# Patient Record
Sex: FEMALE | Race: WHITE | NOT HISPANIC OR LATINO | URBAN - METROPOLITAN AREA
[De-identification: names, ages, dates, MRNs, and addresses within clinical notes are randomized per-mention and may not be internally consistent; named-entity substitution may affect disease eponyms.]

---

## 2018-11-20 ENCOUNTER — OFFICE VISIT (OUTPATIENT)
Dept: OBSTETRICS AND GYNECOLOGY | Age: 33
End: 2018-11-20

## 2018-11-20 VITALS
WEIGHT: 141.8 LBS | SYSTOLIC BLOOD PRESSURE: 112 MMHG | DIASTOLIC BLOOD PRESSURE: 78 MMHG | BODY MASS INDEX: 25.12 KG/M2 | HEIGHT: 63 IN

## 2018-11-20 DIAGNOSIS — F41.9 ANXIETY: Primary | ICD-10-CM

## 2018-11-20 DIAGNOSIS — Z00.00 ENCOUNTER FOR ANNUAL PHYSICAL EXAM: ICD-10-CM

## 2018-11-20 DIAGNOSIS — Z12.4 SCREENING FOR MALIGNANT NEOPLASM OF CERVIX: ICD-10-CM

## 2018-11-20 DIAGNOSIS — Z77.21 HISTORY OF EXPOSURE TO HAZARDOUS BODILY FLUIDS: ICD-10-CM

## 2018-11-20 DIAGNOSIS — Z13.89 SCREENING FOR BLOOD OR PROTEIN IN URINE: ICD-10-CM

## 2018-11-20 LAB
BILIRUB BLD-MCNC: NEGATIVE MG/DL
GLUCOSE UR STRIP-MCNC: NEGATIVE MG/DL
KETONES UR QL: NEGATIVE
LEUKOCYTE EST, POC: NEGATIVE
NITRITE UR-MCNC: NEGATIVE MG/ML
PH UR: 7 [PH] (ref 5–8)
PROT UR STRIP-MCNC: NEGATIVE MG/DL
RBC # UR STRIP: NEGATIVE /UL
SP GR UR: 1.01 (ref 1–1.03)
UROBILINOGEN UR QL: NORMAL

## 2018-11-20 PROCEDURE — 99385 PREV VISIT NEW AGE 18-39: CPT | Performed by: OBSTETRICS & GYNECOLOGY

## 2018-11-20 PROCEDURE — 81002 URINALYSIS NONAUTO W/O SCOPE: CPT | Performed by: OBSTETRICS & GYNECOLOGY

## 2018-11-20 RX ORDER — CETIRIZINE HYDROCHLORIDE 10 MG/1
10 TABLET ORAL DAILY
COMMUNITY
End: 2021-01-09

## 2018-11-20 RX ORDER — BUSPIRONE HYDROCHLORIDE 10 MG/1
10 TABLET ORAL 3 TIMES DAILY
COMMUNITY

## 2018-11-20 NOTE — PROGRESS NOTES
"Subjective     Chief Complaint   Patient presents with   • Gynecologic Exam     annual. last pap 2013 per pt. urinary frequency, painful urination, cloudy          History of Present Illness      Mirta Pascual is a very pleasant  33 y.o. female who presents for annual exam.  Mammo Exam none, Contraception none strongly recommending usage of condoms at least, Exercise encouraged increase.  Counseled patient about avoiding any type of smoking with a mirror monitor her cigarettes  Patient would like to have STI screening.      Obstetric History:  OB History      Para Term  AB Living    0 0 0 0 0 0    SAB TAB Ectopic Molar Multiple Live Births    0 0 0 0 0 0         Menstrual History:     Patient's last menstrual period was 10/16/2018.       Sexual History:       Past Medical History:   Diagnosis Date   • UTI (urinary tract infection)      Past Surgical History:   Procedure Laterality Date   • WISDOM TOOTH EXTRACTION         SOCIAL Hx:      The following portions of the patient's history were reviewed and updated as appropriate: allergies, current medications, past family history, past medical history, past social history, past surgical history and problem list.    Review of Systems        Except as outlined in history of physical illness, patient denies any changes in her GYN, , GI systems.  All other systems reviewed were negative.         Objective   Physical Exam    /78   Ht 160 cm (63\")   Wt 64.3 kg (141 lb 12.8 oz)   LMP 10/16/2018   BMI 25.12 kg/m²     General: Patient is alert and oriented and appears overall healthy  Neck: Is supple without thyromegaly, no carotid bruits and no lymphadenopathy  Lungs: Clear bilaterally, no wheezing, rhonchi, or rales.  Respiratory rate is normal  Breast: Even, symmetrical, no lymphadenopathy, no retraction, no masses or cysts  Heart: Regular rate and rhythm are appreciated, no murmurs or rubs are heard  Abdomen: Is soft, without organomegaly, bowel " sounds are positive, there is no                                rebound or guarding and palpation does not produce any discomfort  Back: Nontender without CVA tenderness  Pelvic: External genitalia appear normal and consistent with mature female.  BUS normal                            Vagina is clean dry without discharge and appears adequately estrogenized, no               lesions or masses are present                         Cervix is noninflamed without discharge or lesions.  There is no cervical motion             tenderness.                Uterus is nonenlarged, without tenderness, and no masses or abnormalities are  present               Adnexa are non-enlarged, non tender               Rectal exam reveals adequate sphincter tone and no masses or lesions are                     appreciated on digital rectal examination.    Patient Active Problem List   Diagnosis   • Anxiety                 Assessment/Plan   Mirta was seen today for gynecologic exam.    Diagnoses and all orders for this visit:    Anxiety  -     NuSwab VG+ - Swab, Vagina  -     RPR  -     HIV-1 / O / 2 Ag / Antibody 4th Generation  -     Hepatitis C Antibody    History of exposure to hazardous bodily fluids  -     NuSwab VG+ - Swab, Vagina  -     RPR  -     HIV-1 / O / 2 Ag / Antibody 4th Generation  -     Hepatitis C Antibody    Encounter for annual physical exam    Pap smear  Contraceptive options reviewed    Annual Well Woman Exam    Discussed today's findings and concerns with patient in detail.  Continue to recommend regular exercise to include cardiovascular and resistance training and breast self-exam. Wellness lab, mammography, and pap smear, in accordance with age guidelines.  Nutritional  recommendations  were discussed.    All of the patient's questions were addressed and answered, I have encouraged her to call for today's test results if she has not received them within 10 days.  Patient is advised to call with any change in her  condition or with any other questions, otherwise return in 12 months for annual examination.

## 2018-11-21 LAB
HCV AB S/CO SERPL IA: 0.2 S/CO RATIO (ref 0–0.9)
HIV 1+2 AB+HIV1 P24 AG SERPL QL IA: NON REACTIVE
RPR SER QL: NORMAL

## 2018-11-21 NOTE — PROGRESS NOTES
Please notify pt. That labs are with in normal limits bloodwork results are all normal as was a urinalysis, still waiting on the culture

## 2018-11-22 LAB
A VAGINAE DNA VAG QL NAA+PROBE: ABNORMAL SCORE
BVAB2 DNA VAG QL NAA+PROBE: ABNORMAL SCORE
C ALBICANS DNA VAG QL NAA+PROBE: NEGATIVE
C GLABRATA DNA VAG QL NAA+PROBE: NEGATIVE
C TRACH RRNA SPEC QL NAA+PROBE: POSITIVE
MEGA1 DNA VAG QL NAA+PROBE: ABNORMAL SCORE
N GONORRHOEA RRNA SPEC QL NAA+PROBE: NEGATIVE
T VAGINALIS RRNA SPEC QL NAA+PROBE: NEGATIVE

## 2018-11-23 PROBLEM — Z20.2 CHLAMYDIA CONTACT, TREATED: Status: ACTIVE | Noted: 2018-11-23

## 2018-11-23 RX ORDER — AZITHROMYCIN 250 MG/1
TABLET, FILM COATED ORAL
Qty: 6 TABLET | Refills: 0 | OUTPATIENT
Start: 2018-11-23 | End: 2021-01-09

## 2018-11-26 ENCOUNTER — TELEPHONE (OUTPATIENT)
Dept: OBSTETRICS AND GYNECOLOGY | Age: 33
End: 2018-11-26

## 2018-11-26 LAB
CYTOLOGIST CVX/VAG CYTO: ABNORMAL
CYTOLOGY CVX/VAG DOC THIN PREP: ABNORMAL
DX ICD CODE: ABNORMAL
DX ICD CODE: ABNORMAL
HIV 1 & 2 AB SER-IMP: ABNORMAL
HPV I/H RISK 4 DNA CVX QL PROBE+SIG AMP: POSITIVE
HPV16 DNA CVX QL PROBE+SIG AMP: POSITIVE
HPV18+45 E6+E7 MRNA CVX QL NAA+PROBE: POSITIVE
OTHER STN SPEC: ABNORMAL
PATH REPORT.FINAL DX SPEC: ABNORMAL
PATHOLOGIST CVX/VAG CYTO: ABNORMAL
RECOM F/U CVX/VAG CYTO: ABNORMAL
STAT OF ADQ CVX/VAG CYTO-IMP: ABNORMAL

## 2018-11-26 RX ORDER — AZITHROMYCIN 500 MG/1
TABLET, FILM COATED ORAL
Qty: 6 TABLET | Refills: 0 | Status: CANCELLED | OUTPATIENT
Start: 2018-11-26

## 2018-11-26 RX ORDER — AZITHROMYCIN 250 MG/1
TABLET, FILM COATED ORAL
Qty: 6 TABLET | Refills: 0 | Status: SHIPPED | OUTPATIENT
Start: 2018-11-26 | End: 2018-12-01

## 2018-11-26 NOTE — TELEPHONE ENCOUNTER
Patient would like to speak with Maryse about the prescriptions.  Patient does not have insurance and would like to know the cost. Informed patient she will need to call her pharmacy to see what the price is.

## 2018-11-26 NOTE — TELEPHONE ENCOUNTER
RX was sent to print instead of to pharmacy. Sent e-rx to pharmacy. Pt notified. I also answered additional questions pt had about diagnosis.

## 2018-11-26 NOTE — TELEPHONE ENCOUNTER
----- Message from Delvin Greco MD sent at 11/21/2018  8:52 AM EST -----  Please notify pt. That labs are with in normal limits bloodwork results are all normal as was a urinalysis, still waiting on the culture

## 2018-11-27 ENCOUNTER — TELEPHONE (OUTPATIENT)
Dept: OBSTETRICS AND GYNECOLOGY | Age: 33
End: 2018-11-27

## 2018-11-27 NOTE — TELEPHONE ENCOUNTER
----- Message from Delvin Greco MD sent at 11/27/2018  8:48 AM EST -----  Please call the patient regarding her abnormal result. 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

## 2018-11-28 ENCOUNTER — TELEPHONE (OUTPATIENT)
Dept: OBSTETRICS AND GYNECOLOGY | Age: 33
End: 2018-11-28

## 2020-04-29 ENCOUNTER — OFFICE VISIT (OUTPATIENT)
Dept: OBSTETRICS AND GYNECOLOGY | Age: 35
End: 2020-04-29

## 2020-04-29 VITALS
DIASTOLIC BLOOD PRESSURE: 64 MMHG | WEIGHT: 141 LBS | HEIGHT: 63 IN | BODY MASS INDEX: 24.98 KG/M2 | SYSTOLIC BLOOD PRESSURE: 110 MMHG

## 2020-04-29 DIAGNOSIS — N72 CERVICITIS AND ENDOCERVICITIS: ICD-10-CM

## 2020-04-29 DIAGNOSIS — R10.31 BILATERAL LOWER ABDOMINAL PAIN: Primary | ICD-10-CM

## 2020-04-29 DIAGNOSIS — R10.2 PELVIC PAIN: ICD-10-CM

## 2020-04-29 DIAGNOSIS — R10.32 BILATERAL LOWER ABDOMINAL PAIN: Primary | ICD-10-CM

## 2020-04-29 DIAGNOSIS — Z20.2 CHLAMYDIA CONTACT, TREATED: ICD-10-CM

## 2020-04-29 LAB
BILIRUB BLD-MCNC: NEGATIVE MG/DL
CLARITY, POC: CLEAR
COLOR UR: YELLOW
GLUCOSE UR STRIP-MCNC: NEGATIVE MG/DL
KETONES UR QL: NEGATIVE
LEUKOCYTE EST, POC: ABNORMAL
NITRITE UR-MCNC: NEGATIVE MG/ML
PH UR: 6.5 [PH] (ref 5–8)
PROT UR STRIP-MCNC: NEGATIVE MG/DL
RBC # UR STRIP: NEGATIVE /UL
SP GR UR: 1.02 (ref 1–1.03)
UROBILINOGEN UR QL: NORMAL

## 2020-04-29 PROCEDURE — 99213 OFFICE O/P EST LOW 20 MIN: CPT | Performed by: OBSTETRICS & GYNECOLOGY

## 2020-04-29 PROCEDURE — 81002 URINALYSIS NONAUTO W/O SCOPE: CPT | Performed by: OBSTETRICS & GYNECOLOGY

## 2020-04-29 RX ORDER — GUANFACINE 1 MG/1
TABLET, EXTENDED RELEASE ORAL
COMMUNITY
Start: 2020-04-07

## 2020-04-29 RX ORDER — DOXYCYCLINE HYCLATE 100 MG
100 TABLET ORAL 2 TIMES DAILY
Qty: 14 TABLET | Refills: 1 | OUTPATIENT
Start: 2020-04-29 | End: 2021-01-09

## 2020-04-29 RX ORDER — ECHINACEA PURPUREA EXTRACT 125 MG
1 TABLET ORAL AS NEEDED
COMMUNITY
End: 2021-01-09

## 2020-04-29 RX ORDER — FLUTICASONE PROPIONATE 50 MCG
SPRAY, SUSPENSION (ML) NASAL
COMMUNITY
Start: 2020-03-20

## 2020-04-29 NOTE — PROGRESS NOTES
Subjective     Chief Complaint   Patient presents with   • Gynecologic Exam     Gyn problem: sharp pains,bilateral sides x 6 months       History of Present Illness  Mirta Pascual is a 35 y.o. female is being seen today for 2 to 3-month history of bilateral intermittent lower abdominal pain that was mildly present even for the past 6 months    Patient never followed up with us for her scheduled colposcopy for high risk HPV    She is had chlamydia several times, she stated that she followed up with health department for her Pap smear and colposcopy    Her cycles are regular.  She does not like birth control pills she is using condoms regularly    She denies any fever chills back pain dysuria.    She states she is only had 2 sexual partners but they have tested positive for chlamydia as well in the past  Chief Complaint   Patient presents with   • Gynecologic Exam     Gyn problem: sharp pains,bilateral sides x 6 months   .        The following portions of the patient's history were reviewed and updated as appropriate: allergies, current medications, past family history, past medical history, past social history, past surgical history and problem list.    PAST MEDICAL HISTORY  Past Medical History:   Diagnosis Date   • UTI (urinary tract infection)      OB History    Para Term  AB Living   0 0 0 0 0 0   SAB TAB Ectopic Molar Multiple Live Births   0 0 0 0 0 0     Past Surgical History:   Procedure Laterality Date   • WISDOM TOOTH EXTRACTION       Family History   Problem Relation Age of Onset   • Hypertension Father    • Hypertension Paternal Grandmother    • Diabetes Paternal Grandmother    • Heart disease Paternal Grandmother      Social History     Tobacco Use   Smoking Status Never Smoker   Smokeless Tobacco Never Used       Current Outpatient Medications:   •  busPIRone (BUSPAR) 10 MG tablet, Take 10 mg by mouth 3 (Three) Times a Day., Disp: , Rfl:   •  cetirizine (zyrTEC) 10 MG tablet, Take 10 mg by  mouth Daily., Disp: , Rfl:   •  fluticasone (FLONASE) 50 MCG/ACT nasal spray, , Disp: , Rfl:   •  guanFACINE HCl ER (INTUNIV) 1 MG tablet sustained-release 24 hour, , Disp: , Rfl:   •  MUCINEX 600 MG 12 hr tablet, , Disp: , Rfl:   •  sodium chloride 0.65 % nasal spray, 1 spray into the nostril(s) as directed by provider As Needed for Congestion., Disp: , Rfl:   •  azithromycin (ZITHROMAX Z-ADAN) 250 MG tablet, Take 2 tablets (500 mg) on  Day 1,  followed by 1 tablet (250 mg) once daily on Days 2 through 5., Disp: 6 tablet, Rfl: 0  •  doxycycline (VIBRAMYICN) 100 MG tablet, Take 1 tablet by mouth 2 (Two) Times a Day., Disp: 14 tablet, Rfl: 1    There is no immunization history on file for this patient.    Review of Systems       Except as outlined in history of physical illness, patient denies any changes in her GYN, , GI systems. All other systems reviewed are negative.    Objective   Physical Exam   Alert and oriented, respirations unlabored, heart regular rate and rhythm   Pelvic external genitalia normal vagina clean dry intact cervix is very friable but there is no discharge, it bled easily with culture and Pap smear, cervix uterus not enlarged adnexa not enlarged minimal discomfort on either side.  No rebound no guarding.  Rectal exams normal      Assessment/Plan   Mirta was seen today for gynecologic exam.    Diagnoses and all orders for this visit:    Bilateral lower abdominal pain  -     POC Urinalysis Dipstick  -     RPR  -     HIV-1 / O / 2 Ag / Antibody 4th Generation  -     Hepatitis C Antibody    Chlamydia contact, treated  -     RPR  -     HIV-1 / O / 2 Ag / Antibody 4th Generation  -     Hepatitis C Antibody    Pelvic pain    Cervicitis and endocervicitis  -     RPR  -     HIV-1 / O / 2 Ag / Antibody 4th Generation  -     Hepatitis C Antibody    Other orders  -     doxycycline (VIBRAMYICN) 100 MG tablet; Take 1 tablet by mouth 2 (Two) Times a Day.    We will await results of STD screening, treat her  with the doxycycline, and tentatively follow-up with us in 4 weeks time for an ultrasound if her pain is not completely better after antibiotic therapy  Have advised her to have her partners screened as well and continue using condoms, she declines any other form of birth control  If pain continues and ultrasound is negative could proceed with a diagnostic laparoscopy.  Also discussed the likelihood that she might need a colposcopy as we had originally scheduled, but she followed up with the health department for insurance reasons.                     EMR Dragon/ Transcription disclaimer:  Much of the encounter note is an electronic transcription/translation of spoken language to printed text. The electronic translation of spoken language may permit erroneous, or at times, nonessential words or phrases to be inadvertently transcribes; Although i have reviewed the note for such errors, some may still exist.

## 2020-04-30 LAB
HCV AB S/CO SERPL IA: <0.1 S/CO RATIO (ref 0–0.9)
HIV 1+2 AB+HIV1 P24 AG SERPL QL IA: NON REACTIVE
RPR SER QL: NORMAL

## 2020-05-01 ENCOUNTER — TELEPHONE (OUTPATIENT)
Dept: OBSTETRICS AND GYNECOLOGY | Age: 35
End: 2020-05-01

## 2020-05-01 LAB
CYTOLOGIST CVX/VAG CYTO: ABNORMAL
CYTOLOGY CVX/VAG DOC CYTO: ABNORMAL
CYTOLOGY CVX/VAG DOC THIN PREP: ABNORMAL
DX ICD CODE: ABNORMAL
DX ICD CODE: ABNORMAL
HIV 1 & 2 AB SER-IMP: ABNORMAL
HPV I/H RISK 4 DNA CVX QL PROBE+SIG AMP: POSITIVE
HPV16 DNA CVX QL PROBE+SIG AMP: POSITIVE
HPV18+45 E6+E7 MRNA CVX QL NAA+PROBE: POSITIVE
OTHER STN SPEC: ABNORMAL
PATHOLOGIST CVX/VAG CYTO: ABNORMAL
RECOM F/U CVX/VAG CYTO: ABNORMAL
STAT OF ADQ CVX/VAG CYTO-IMP: ABNORMAL

## 2020-05-01 NOTE — TELEPHONE ENCOUNTER
----- Message from Delvin Greco MD sent at 5/1/2020  9:12 AM EDT -----  Please notify pt. That labs are with in normal limits, the lab work portion has come back we are still awaiting the cultures.

## 2020-05-04 ENCOUNTER — TELEPHONE (OUTPATIENT)
Dept: OBSTETRICS AND GYNECOLOGY | Age: 35
End: 2020-05-04

## 2020-05-04 NOTE — TELEPHONE ENCOUNTER
----- Message from Delvin Greco MD sent at 5/1/2020 11:40 PM EDT -----  Please notify patient that her Pap smear shows mild dysplasia and positive HPV recommend following up with colposcopy as previously discussed

## 2020-05-06 ENCOUNTER — TELEPHONE (OUTPATIENT)
Dept: OBSTETRICS AND GYNECOLOGY | Age: 35
End: 2020-05-06

## 2020-05-06 LAB
A VAGINAE DNA VAG QL NAA+PROBE: ABNORMAL SCORE
BVAB2 DNA VAG QL NAA+PROBE: ABNORMAL SCORE
C ALBICANS DNA VAG QL NAA+PROBE: NEGATIVE
C GLABRATA DNA VAG QL NAA+PROBE: NEGATIVE
C TRACH DNA VAG QL NAA+PROBE: NEGATIVE
MEGA1 DNA VAG QL NAA+PROBE: ABNORMAL SCORE
N GONORRHOEA DNA VAG QL NAA+PROBE: NEGATIVE
T VAGINALIS DNA VAG QL NAA+PROBE: NEGATIVE

## 2020-05-06 RX ORDER — METRONIDAZOLE 500 MG/1
500 TABLET ORAL 3 TIMES DAILY
Qty: 21 TABLET | Refills: 0 | Status: SHIPPED | OUTPATIENT
Start: 2020-05-06 | End: 2020-05-13

## 2020-05-06 NOTE — TELEPHONE ENCOUNTER
Flagyl is the gold standard treatment medication for BV. Could use tindamax if desired or could try a vaginal cream as well

## 2020-05-06 NOTE — TELEPHONE ENCOUNTER
Pt requesting Maryse to call her back @ 3:30 regarding her lab results and medication she has to take

## 2020-05-06 NOTE — TELEPHONE ENCOUNTER
----- Message from Delvin Greco MD sent at 5/6/2020 10:27 AM EDT -----  Cultures showed BV but no chlamydia or gonorrhea or yeast or trichomonas.  I have sent some antibiotics in for her bacterial vaginosis.  Also needs to keep her colposcopy appointment as discussed

## 2020-05-06 NOTE — TELEPHONE ENCOUNTER
(Angus pt)  Pt states Dr. Greco called in flagyl again but she wanted to know if there was something stronger she could take.   Dr. Greco & Maryse have left for the day.      Pharmacy verified.    [phone]

## 2020-05-07 ENCOUNTER — TELEPHONE (OUTPATIENT)
Dept: OBSTETRICS AND GYNECOLOGY | Age: 35
End: 2020-05-07

## 2020-06-03 ENCOUNTER — PROCEDURE VISIT (OUTPATIENT)
Dept: OBSTETRICS AND GYNECOLOGY | Age: 35
End: 2020-06-03

## 2020-06-03 ENCOUNTER — OFFICE VISIT (OUTPATIENT)
Dept: OBSTETRICS AND GYNECOLOGY | Age: 35
End: 2020-06-03

## 2020-06-03 VITALS
DIASTOLIC BLOOD PRESSURE: 68 MMHG | SYSTOLIC BLOOD PRESSURE: 120 MMHG | WEIGHT: 138 LBS | HEIGHT: 63 IN | BODY MASS INDEX: 24.45 KG/M2

## 2020-06-03 DIAGNOSIS — B97.7 HPV IN FEMALE: Primary | ICD-10-CM

## 2020-06-03 DIAGNOSIS — R10.2 PELVIC PAIN: Primary | ICD-10-CM

## 2020-06-03 DIAGNOSIS — R87.612 LGSIL ON PAP SMEAR OF CERVIX: ICD-10-CM

## 2020-06-03 PROBLEM — Q51.3 BICORNUATE UTERUS: Status: ACTIVE | Noted: 2020-06-03

## 2020-06-03 PROCEDURE — 57456 ENDOCERV CURETTAGE W/SCOPE: CPT | Performed by: OBSTETRICS & GYNECOLOGY

## 2020-06-03 PROCEDURE — 76830 TRANSVAGINAL US NON-OB: CPT | Performed by: OBSTETRICS & GYNECOLOGY

## 2020-06-03 NOTE — PROGRESS NOTES
Procedure   Procedures       Physical Exam    Colposcopy Procedure Note        Indications: Pap smear   showed: low-grade squamous intraepithelial neoplasia (LGSIL - encompassing HPV,mild dysplasia,JG I)        Procedure Details   The risks and benefits of the procedure and verbal, informed consent obtained.    Speculum placed in vagina and excellent visualization of cervix achieved, cervix swabbed x 3 with acetic acid solution. Lugol's solution was also utilized for another visualization of the cervix.    Findings:  Cervix: acetowhite lesion(s) noted at 2:00 6:00, very light very mild no surface changes no abnormal vasculature, transformation zone was normal o'clock; cervix swabbed with Lugol's solution, endocervical speculum placed and SCJ visualized 360 degrees without lesions.  Vaginal inspection: normal without visible lesions.  Vulvar colposcopy: vulvar colposcopy not performed.    Specimens: ECC    Complications: none.  Patient tolerated the procedure well  Plan:  Will be based on findings and results from today's exam.  If consistent with LGSIL or mild dysplasia patient will simply return for next year's annual exam        6/3/2020  Delvin Greco MD    EMR Dragon/ Transcription disclaimer:  Much of the encounter note is an electronic transcription/translation of spoken language to printed text. The electronic translation of spoken language may permit erroneous, or at times, nonessential words or phrases to be inadvertently transcribes; Although i have reviewed the note for such errors, some may still exist.

## 2020-06-05 LAB
DX ICD CODE: NORMAL
PATH REPORT.FINAL DX SPEC: NORMAL
PATH REPORT.GROSS SPEC: NORMAL
PATH REPORT.SITE OF ORIGIN SPEC: NORMAL
PATHOLOGIST NAME: NORMAL
PAYMENT PROCEDURE: NORMAL

## 2020-06-08 ENCOUNTER — TELEPHONE (OUTPATIENT)
Dept: OBSTETRICS AND GYNECOLOGY | Age: 35
End: 2020-06-08

## 2020-06-08 NOTE — TELEPHONE ENCOUNTER
----- Message from Delvin Greco MD sent at 6/8/2020  9:36 AM EDT -----  Please notify pt. That labs are with in normal limits

## 2020-07-02 ENCOUNTER — TELEPHONE (OUTPATIENT)
Dept: OBSTETRICS AND GYNECOLOGY | Age: 35
End: 2020-07-02

## 2020-07-02 RX ORDER — SULFAMETHOXAZOLE AND TRIMETHOPRIM 400; 80 MG/1; MG/1
1 TABLET ORAL 2 TIMES DAILY
Qty: 10 TABLET | Refills: 0 | Status: SHIPPED | OUTPATIENT
Start: 2020-07-02 | End: 2020-07-07

## 2020-07-02 NOTE — TELEPHONE ENCOUNTER
WOJCIECH.. Spoke with pt. She just got your message and is unable to leave work and get here before you leave for the day.  I informed her that you called the medication into her pharmacy and to start taking it. If she doesn't feel that symptoms are improving by Monday, to give us a call.

## 2020-07-02 NOTE — TELEPHONE ENCOUNTER
I called patient to see if she could come in this morning while we were in the office.  She did not answer and it was transferred to voicemail.  I let her know that I wanted her to come in but in the meantime I am going to send some Septra and for her suspected urinary tract infection

## 2020-07-02 NOTE — TELEPHONE ENCOUNTER
Patient is calling:  C/O painful urination, cloudy and frequency.  She also says that she has a swelling on the left side of he vagina.  Please advise.

## 2020-07-07 ENCOUNTER — TELEPHONE (OUTPATIENT)
Dept: OBSTETRICS AND GYNECOLOGY | Age: 35
End: 2020-07-07

## 2020-07-07 NOTE — TELEPHONE ENCOUNTER
Patient called, stated that she was  prescribed smz-tmp 400-80mg her symptoms have improved, but  Her clitoris is still inflamed and hurts. patient wanted to know what you advised?

## 2020-07-08 NOTE — TELEPHONE ENCOUNTER
It often takes a while after treatment for symptoms to completely resolve.  That set of circumstances is not unusual.  However, we can always reinspect and reexamine if she would like.  If so can work in with me or nurse practitioner next week

## 2020-07-08 NOTE — TELEPHONE ENCOUNTER
"Called patient to notified. Patient stated that her urine is cloudy after she uses the restroom and has the sensation to where she has to  \"bare down\".Patient stated that its still pressure in her vagina area, and her clitoris is still itching, she also stated that she is nervous to have intercourse its because its painful (patient stated that her symptoms have gotten better, but the itching is still there)        Patient still has concerns might feel as though everything is still not be treated. Patient's overall question is all of these symptoms normal? Is this how her body is supposed to react to antibiotics? does she need to be prescribed more medication, and she also wanted to know what you advised.   "

## 2021-04-02 ENCOUNTER — TELEPHONE (OUTPATIENT)
Dept: OBSTETRICS AND GYNECOLOGY | Age: 36
End: 2021-04-02

## 2021-04-02 NOTE — TELEPHONE ENCOUNTER
Dr. Greco pt calling is having possible ovary pain, had US prior in June 2020, pt would like to know what they can do or their options.

## 2021-04-07 ENCOUNTER — OFFICE VISIT (OUTPATIENT)
Dept: OBSTETRICS AND GYNECOLOGY | Age: 36
End: 2021-04-07

## 2021-04-07 VITALS
BODY MASS INDEX: 23.57 KG/M2 | DIASTOLIC BLOOD PRESSURE: 64 MMHG | SYSTOLIC BLOOD PRESSURE: 104 MMHG | WEIGHT: 133 LBS | HEIGHT: 63 IN

## 2021-04-07 DIAGNOSIS — R10.2 PELVIC PAIN: ICD-10-CM

## 2021-04-07 DIAGNOSIS — N94.0 OVULATORY PAIN: Primary | ICD-10-CM

## 2021-04-07 LAB
BILIRUB BLD-MCNC: NEGATIVE MG/DL
CLARITY, POC: CLEAR
COLOR UR: YELLOW
GLUCOSE UR STRIP-MCNC: NEGATIVE MG/DL
KETONES UR QL: NEGATIVE
LEUKOCYTE EST, POC: NEGATIVE
NITRITE UR-MCNC: NEGATIVE MG/ML
PH UR: 6.5 [PH] (ref 5–8)
PROT UR STRIP-MCNC: NEGATIVE MG/DL
RBC # UR STRIP: NEGATIVE /UL
SP GR UR: 1.02 (ref 1–1.03)
UROBILINOGEN UR QL: NORMAL

## 2021-04-07 PROCEDURE — 99213 OFFICE O/P EST LOW 20 MIN: CPT | Performed by: NURSE PRACTITIONER

## 2021-04-07 PROCEDURE — 81002 URINALYSIS NONAUTO W/O SCOPE: CPT | Performed by: NURSE PRACTITIONER

## 2021-04-07 RX ORDER — LORATADINE 10 MG/1
TABLET ORAL
COMMUNITY
Start: 2021-01-06

## 2021-04-07 NOTE — PROGRESS NOTES
"Subjective     Chief Complaint   Patient presents with   • Gynecologic Exam     Pelvic pain       Mirta Pascual is a 36 y.o.  whose LMP is Patient's last menstrual period was 2021 (exact date).     Pt presents today with chief complaint of pelvic pain   She states her periods are regular about every 28-32 days  Having pain mid cycle c/w ovulatory pain  This has been ongoing for quite sometime  She states the pain will last a few minutes sometimes longer and then go away  Denies vaginal discharge or dysuria  Was with one partner for the past year - recently broke up and had IC with new sexual partner  She declines STD testing  She has no abnormal discharge  Pt of Dr. Greco    No Additional Complaints Reported    The following portions of the patient's history were reviewed and updated as appropriate:vital signs, allergies, current medications, past medical history, past social history, past surgical history and problem list      Review of Systems   A comprehensive review of systems was negative except for: Genitourinary: positive for pelvic pain      Objective      /64   Ht 160 cm (63\")   Wt 60.3 kg (133 lb)   LMP 2021 (Exact Date)   BMI 23.56 kg/m²     Physical Exam    General:   alert and no distress   Heart: Not performed today   Lungs: Not performed today.   Breast: na   Neck: na   Abdomen: {Not performed today   CVA: Not performed today   Pelvis: External genitalia: normal general appearance  Urinary system: urethral meatus normal  Vaginal: normal mucosa without prolapse or lesions, normal without tenderness, induration or masses, normal rugae and discharge, white (c/w yeast)  Cervix: normal appearance  Adnexa: normal bimanual exam  Uterus: normal single, nontender   Extremities: Not performed today   Neurologic: negative   Psychiatric: Normal affect, judgement, and mood       Lab Review   Labs: U/A - negative    Imaging   Ultrasound - Pelvic Vaginal - Endometrial thickness 9.08 " mm  Left follicle measuring 12.5 mm x 17.1 mm  Right ovary normal    Assessment/Plan     ASSESSMENT  1. Ovulatory pain    2. Pelvic pain        PLAN  1.   Orders Placed This Encounter   Procedures   • POC Urinalysis Dipstick       2. Medications prescribed this encounter:      No orders of the defined types were placed in this encounter.      3. U/S reassuring. C/w menstrual cycle. Pain c/w with ovulatory pain. Discussed OCP's for ovarian suppression but patient declines.  She declines STD testing today    Follow up: 1 month(s) for annual exam     GABRIELLA Srivastava  4/7/2021

## 2021-04-16 ENCOUNTER — BULK ORDERING (OUTPATIENT)
Dept: CASE MANAGEMENT | Facility: OTHER | Age: 36
End: 2021-04-16

## 2021-04-16 DIAGNOSIS — Z23 IMMUNIZATION DUE: ICD-10-CM

## 2021-05-25 ENCOUNTER — OFFICE VISIT (OUTPATIENT)
Dept: OBSTETRICS AND GYNECOLOGY | Age: 36
End: 2021-05-25

## 2021-05-25 VITALS
HEIGHT: 63 IN | SYSTOLIC BLOOD PRESSURE: 118 MMHG | DIASTOLIC BLOOD PRESSURE: 72 MMHG | WEIGHT: 131 LBS | BODY MASS INDEX: 23.21 KG/M2

## 2021-05-25 DIAGNOSIS — N94.0 MITTELSCHMERZ: ICD-10-CM

## 2021-05-25 DIAGNOSIS — Z30.09 GENERAL COUNSELING AND ADVICE FOR CONTRACEPTIVE MANAGEMENT: ICD-10-CM

## 2021-05-25 DIAGNOSIS — Z01.419 ENCOUNTER FOR GYNECOLOGICAL EXAMINATION: Primary | ICD-10-CM

## 2021-05-25 DIAGNOSIS — Z77.21 HISTORY OF EXPOSURE TO HAZARDOUS BODILY FLUIDS: ICD-10-CM

## 2021-05-25 PROCEDURE — 99395 PREV VISIT EST AGE 18-39: CPT | Performed by: OBSTETRICS & GYNECOLOGY

## 2021-05-25 RX ORDER — GUANFACINE 2 MG/1
1 TABLET, EXTENDED RELEASE ORAL
COMMUNITY
Start: 2021-05-21

## 2021-05-25 RX ORDER — NORETHINDRONE ACETATE AND ETHINYL ESTRADIOL 1; .02 MG/1; MG/1
1 TABLET ORAL DAILY
Qty: 21 TABLET | Refills: 12 | Status: SHIPPED | OUTPATIENT
Start: 2021-05-25 | End: 2022-05-25

## 2021-05-25 NOTE — PROGRESS NOTES
Subjective     Chief Complaint   Patient presents with   • Gynecologic Exam     Annual:last pap          History of Present Illness      Mirta Pascual is a very pleasant  36 y.o. female who presents for annual exam.  Mammo Exam age 41, Contraception condoms, Exercise 5 times a week    Patient is trying to straighten out her life, she is engaging in less frequent high risk behavior but she has had exposure to bodily fluids    We have recommended her getting back on birth control pills but she has not, she occasionally uses condoms but not always.  We have extensively talked about high risk behavior and potential for pregnancy and STDs.  Patient will consider getting back on birth control pills, we talked about symptoms side effects and benefits.  We also discussed the importance to use condoms for STD prevention.  Patient has given me permission to go ahead and send some to her pharmacy she may or may not pick those up.    Shi seems to be improved, talked about nonsteroidals for a day or so at a time.  Also discussed how important and what an improvement she would likely have if she was on birth control pills.    History of abnormal Pap smears, will await today's Pap result.      Obstetric History:  OB History        0    Para   0    Term   0       0    AB   0    Living   0       SAB   0    TAB   0    Ectopic   0    Molar   0    Multiple   0    Live Births   0               Menstrual History:     Patient's last menstrual period was 2021 (exact date).       Sexual History:       Past Medical History:   Diagnosis Date   • Bicornuate uterus 6/3/2020   • UTI (urinary tract infection)      Past Surgical History:   Procedure Laterality Date   • WISDOM TOOTH EXTRACTION         SOCIAL Hx:      The following portions of the patient's history were reviewed and updated as appropriate: allergies, current medications, past family history, past medical history, past social history, past surgical  "history and problem list.    Review of Systems        Except as outlined in history of physical illness, patient denies any changes in her GYN, , GI systems.  All other systems reviewed were negative.         Current Outpatient Medications:   •  busPIRone (BUSPAR) 10 MG tablet, Take 10 mg by mouth 3 (Three) Times a Day., Disp: , Rfl:   •  fluticasone (FLONASE) 50 MCG/ACT nasal spray, , Disp: , Rfl:   •  guanFACINE HCl ER (INTUNIV) 1 MG tablet sustained-release 24 hour, , Disp: , Rfl:   •  guanFACINE HCl ER 2 MG tablet sustained-release 24 hour, Take 1 tablet by mouth every night at bedtime., Disp: , Rfl:   •  loratadine (CLARITIN) 10 MG tablet, TAKE 1 TABLET BY MOUTH EVERY DAY for allergies, Disp: , Rfl:   •  norethindrone-ethinyl estradiol (Loestrin 1/20, 21,) 1-20 MG-MCG per tablet, Take 1 tablet by mouth Daily., Disp: 21 tablet, Rfl: 12   Objective   Physical Exam    /72   Ht 160 cm (63\")   Wt 59.4 kg (131 lb)   LMP 05/23/2021 (Exact Date)   Breastfeeding No   BMI 23.21 kg/m²     General: Patient is alert and oriented and appears overall healthy  Neck: Is supple without thyromegaly, no carotid bruits and no lymphadenopathy  Lungs: Clear bilaterally, no wheezing, rhonchi, or rales.  Respiratory rate is normal  Breast: Even, symmetrical, no lymphadenopathy, no retraction, no masses or cysts  Heart: Regular rate and rhythm are appreciated, no murmurs or rubs are heard  Abdomen: Is soft, without organomegaly, bowel sounds are positive, there is no                                rebound or guarding and palpation does not produce any discomfort  Back: Nontender without CVA tenderness  Pelvic: External genitalia appear normal and consistent with mature female.  BUS normal                            Vagina is clean dry without discharge and appears adequately estrogenized, no               lesions or masses are present                         Cervix is noninflamed without discharge or lesions.  There is no " cervical motion             tenderness.                Uterus is nonenlarged, without tenderness, and no masses or abnormalities are  present               Adnexa are non-enlarged, non tender               Rectal exam reveals adequate sphincter tone and no masses or lesions are                     appreciated on digital rectal examination.      Annual Well Woman Exam  Patient Active Problem List   Diagnosis   • Anxiety   • Chlamydia contact, treated   • Pelvic pain   • Cervicitis and endocervicitis   • HPV in female   • LGSIL on Pap smear of cervix   • Bicornuate uterus   • Shi                 Assessment/Plan   Diagnoses and all orders for this visit:    1. Encounter for gynecological examination (Primary)  -     IGP,CtNgTv,rfx Aptima HPV ASCU    2. General counseling and advice for contraceptive management  We have recommended her getting back on birth control pills but she has not, she occasionally uses condoms but not always.  We have extensively talked about high risk behavior and potential for pregnancy and STDs.  Patient will consider getting back on birth control pills, we talked about symptoms side effects and benefits.  We also discussed the importance to use condoms for STD prevention.  Patient has given me permission to go ahead and send some to her pharmacy she may or may not pick those up.      3. History of exposure to hazardous bodily fluids  Again counseled her at length regarding minimizing her high risk behavior and there has been some significant improvement on that front.  We will check a GC chlamydia today.  She declines further STD testing.  4. Shi  As outlined above slight improvement will continue to use nonsteroidals  Other orders  -     norethindrone-ethinyl estradiol (Loestrin 1/20, 21,) 1-20 MG-MCG per tablet; Take 1 tablet by mouth Daily.  Dispense: 21 tablet; Refill: 12      Discussed today's findings and concerns with patient.  Continue to recommend regular exercise  including cardiovascular and resistance training as well as  breast self-exam. Wellness lab, mammography, & pap smear, in accordance with age guidelines.    I have encouraged her to call for today's test results if she has not received them within 10 days.  Patient is advised to call with any change in her condition or with any other questions, otherwise return  for annual examination.

## 2021-05-28 LAB
C TRACH RRNA CVX QL NAA+PROBE: NEGATIVE
CONV .: NORMAL
CYTOLOGIST CVX/VAG CYTO: NORMAL
CYTOLOGY CVX/VAG DOC CYTO: NORMAL
CYTOLOGY CVX/VAG DOC THIN PREP: NORMAL
DX ICD CODE: NORMAL
HIV 1 & 2 AB SER-IMP: NORMAL
Lab: NORMAL
N GONORRHOEA RRNA CVX QL NAA+PROBE: NEGATIVE
OTHER STN SPEC: NORMAL
STAT OF ADQ CVX/VAG CYTO-IMP: NORMAL
T VAGINALIS RRNA SPEC QL NAA+PROBE: NEGATIVE